# Patient Record
Sex: FEMALE | Race: WHITE | NOT HISPANIC OR LATINO | URBAN - METROPOLITAN AREA
[De-identification: names, ages, dates, MRNs, and addresses within clinical notes are randomized per-mention and may not be internally consistent; named-entity substitution may affect disease eponyms.]

---

## 2023-03-17 LAB — THYROTROPIN (MIU/L) IN SER/PLAS BY DETECTION LIMIT <= 0.05 MIU/L: 3.19 MIU/L (ref 0.44–3.98)

## 2023-09-08 PROBLEM — E04.2 NONTOXIC MULTINODULAR GOITER: Status: ACTIVE | Noted: 2023-09-08

## 2023-09-08 PROBLEM — R41.3 MEMORY CHANGE: Status: ACTIVE | Noted: 2023-09-08

## 2023-09-08 PROBLEM — G89.29 OTHER CHRONIC PAIN: Status: ACTIVE | Noted: 2023-09-08

## 2023-09-08 PROBLEM — R87.810 CERVICAL HIGH RISK HPV (HUMAN PAPILLOMAVIRUS) TEST POSITIVE: Status: ACTIVE | Noted: 2023-09-08

## 2023-09-08 PROBLEM — M54.41 LUMBAGO WITH SCIATICA, RIGHT SIDE: Status: ACTIVE | Noted: 2023-09-08

## 2023-09-08 PROBLEM — K58.9 IRRITABLE BOWEL: Status: ACTIVE | Noted: 2023-09-08

## 2023-09-08 PROBLEM — E06.3 HASHIMOTO'S THYROIDITIS: Status: ACTIVE | Noted: 2023-09-08

## 2023-09-08 PROBLEM — E78.5 HYPERLIPIDEMIA: Status: ACTIVE | Noted: 2023-09-08

## 2023-09-08 PROBLEM — G44.89 OTHER HEADACHE SYNDROME: Status: ACTIVE | Noted: 2023-09-08

## 2023-09-08 PROBLEM — E03.8 OTHER SPECIFIED HYPOTHYROIDISM: Status: ACTIVE | Noted: 2023-09-08

## 2023-09-08 RX ORDER — HYDROCORTISONE BUTYRATE 1 MG/ML
1 LOTION TOPICAL
COMMUNITY
Start: 2020-07-23 | End: 2024-01-17 | Stop reason: ALTCHOICE

## 2023-09-08 RX ORDER — CITALOPRAM 40 MG/1
TABLET, FILM COATED ORAL
COMMUNITY

## 2023-09-08 RX ORDER — LEVOTHYROXINE SODIUM 50 UG/1
TABLET ORAL
COMMUNITY
End: 2024-01-17

## 2023-09-08 RX ORDER — LEVOTHYROXINE SODIUM 75 UG/1
TABLET ORAL
COMMUNITY
End: 2024-01-17

## 2023-09-11 ENCOUNTER — HOSPITAL ENCOUNTER (OUTPATIENT)
Dept: DATA CONVERSION | Facility: HOSPITAL | Age: 58
Discharge: HOME | End: 2023-09-11
Payer: COMMERCIAL

## 2023-09-11 DIAGNOSIS — E78.49 OTHER HYPERLIPIDEMIA: ICD-10-CM

## 2023-09-11 DIAGNOSIS — Z00.00 ENCOUNTER FOR GENERAL ADULT MEDICAL EXAMINATION WITHOUT ABNORMAL FINDINGS: ICD-10-CM

## 2023-09-11 LAB
ALBUMIN SERPL-MCNC: 4.3 GM/DL (ref 3.5–5)
ALBUMIN/GLOB SERPL: 1.3 RATIO (ref 1.5–3)
ALP BLD-CCNC: 69 U/L (ref 35–125)
ALT SERPL-CCNC: 33 U/L (ref 5–40)
ANION GAP SERPL CALCULATED.3IONS-SCNC: 11 MMOL/L (ref 0–19)
APPEARANCE PLAS: CLEAR
AST SERPL-CCNC: 22 U/L (ref 5–40)
BACTERIA UR QL AUTO: NEGATIVE
BILIRUB SERPL-MCNC: 0.3 MG/DL (ref 0.1–1.2)
BILIRUB UR QL STRIP.AUTO: NEGATIVE
BUN SERPL-MCNC: 18 MG/DL (ref 8–25)
BUN/CREAT SERPL: 20 RATIO (ref 8–21)
CALCIUM SERPL-MCNC: 9.6 MG/DL (ref 8.5–10.4)
CHLORIDE SERPL-SCNC: 103 MMOL/L (ref 97–107)
CHOLEST SERPL-MCNC: 212 MG/DL (ref 133–200)
CHOLEST/HDLC SERPL: 3.7 RATIO
CLARITY UR: CLEAR
CO2 SERPL-SCNC: 26 MMOL/L (ref 24–31)
COLOR SPUN FLD: YELLOW
COLOR UR: COLORLESS
CREAT SERPL-MCNC: 0.9 MG/DL (ref 0.4–1.6)
DEPRECATED RDW RBC AUTO: 39.8 FL (ref 37–54)
ERYTHROCYTE [DISTWIDTH] IN BLOOD BY AUTOMATED COUNT: 11.8 % (ref 11.7–15)
FASTING STATUS PATIENT QL REPORTED: ABNORMAL
GFR SERPL CREATININE-BSD FRML MDRD: 75 ML/MIN/1.73 M2
GLOBULIN SER-MCNC: 3.2 G/DL (ref 1.9–3.7)
GLUCOSE SERPL-MCNC: 97 MG/DL (ref 65–99)
GLUCOSE UR STRIP.AUTO-MCNC: NEGATIVE MG/DL
HCT VFR BLD AUTO: 42.9 % (ref 36–44)
HDLC SERPL-MCNC: 57 MG/DL
HGB BLD-MCNC: 14.8 GM/DL (ref 12–15)
HGB UR QL STRIP.AUTO: NORMAL /HPF (ref 0–3)
HGB UR QL: NEGATIVE
HYALINE CASTS UR QL AUTO: NORMAL /LPF
KETONES UR QL STRIP.AUTO: NEGATIVE
LDLC SERPL CALC-MCNC: 144 MG/DL (ref 65–130)
LEUKOCYTE ESTERASE UR QL STRIP.AUTO: NEGATIVE
MCH RBC QN AUTO: 32.1 PG (ref 26–34)
MCHC RBC AUTO-ENTMCNC: 34.5 % (ref 31–37)
MCV RBC AUTO: 93.1 FL (ref 80–100)
MICROSCOPIC (UA): NORMAL
NITRITE UR QL STRIP.AUTO: NEGATIVE
NRBC BLD-RTO: 0 /100 WBC
PH UR STRIP.AUTO: 5.5 [PH] (ref 4.6–8)
PLATELET # BLD AUTO: 303 K/UL (ref 150–450)
PMV BLD AUTO: 9.3 CU (ref 7–12.6)
POTASSIUM SERPL-SCNC: 4.2 MMOL/L (ref 3.4–5.1)
PROT SERPL-MCNC: 7.5 G/DL (ref 5.9–7.9)
PROT UR STRIP.AUTO-MCNC: NEGATIVE MG/DL
RBC # BLD AUTO: 4.61 M/UL (ref 4–4.9)
SODIUM SERPL-SCNC: 140 MMOL/L (ref 133–145)
SP GR UR STRIP.AUTO: 1.01 (ref 1–1.03)
SQUAMOUS UR QL AUTO: NORMAL /HPF
TRIGL SERPL-MCNC: 57 MG/DL (ref 40–150)
URINE CULTURE: NORMAL
UROBILINOGEN UR QL STRIP.AUTO: NORMAL MG/DL (ref 0–1)
WBC # BLD AUTO: 7.7 K/UL (ref 4.5–11)
WBC #/AREA URNS AUTO: NORMAL /HPF (ref 0–3)

## 2023-09-13 ENCOUNTER — HOSPITAL ENCOUNTER (OUTPATIENT)
Dept: DATA CONVERSION | Facility: HOSPITAL | Age: 58
End: 2023-09-13

## 2023-09-13 DIAGNOSIS — Z12.31 ENCOUNTER FOR SCREENING MAMMOGRAM FOR MALIGNANT NEOPLASM OF BREAST: ICD-10-CM

## 2023-09-13 DIAGNOSIS — Z13.6 ENCOUNTER FOR SCREENING FOR CARDIOVASCULAR DISORDERS: ICD-10-CM

## 2023-10-12 ENCOUNTER — HOSPITAL ENCOUNTER (OUTPATIENT)
Dept: RADIOLOGY | Facility: HOSPITAL | Age: 58
Discharge: HOME | End: 2023-10-12
Payer: COMMERCIAL

## 2023-10-12 VITALS — HEIGHT: 66 IN | BODY MASS INDEX: 25.71 KG/M2 | WEIGHT: 160 LBS

## 2023-10-12 DIAGNOSIS — Z12.31 ENCOUNTER FOR SCREENING MAMMOGRAM FOR MALIGNANT NEOPLASM OF BREAST: ICD-10-CM

## 2023-10-12 DIAGNOSIS — Z13.6 ENCOUNTER FOR SCREENING FOR CARDIOVASCULAR DISORDERS: ICD-10-CM

## 2023-10-12 PROCEDURE — 77063 BREAST TOMOSYNTHESIS BI: CPT | Mod: 50

## 2023-10-12 PROCEDURE — 75571 CT HRT W/O DYE W/CA TEST: CPT

## 2023-11-16 ENCOUNTER — HOSPITAL ENCOUNTER (OUTPATIENT)
Dept: CARDIOLOGY | Facility: HOSPITAL | Age: 58
Discharge: HOME | End: 2023-11-16
Payer: COMMERCIAL

## 2023-11-16 DIAGNOSIS — R06.02 SHORTNESS OF BREATH: ICD-10-CM

## 2023-11-16 PROCEDURE — 93016 CV STRESS TEST SUPVJ ONLY: CPT | Performed by: INTERNAL MEDICINE

## 2023-11-16 PROCEDURE — 93018 CV STRESS TEST I&R ONLY: CPT | Performed by: INTERNAL MEDICINE

## 2023-11-16 PROCEDURE — 93350 STRESS TTE ONLY: CPT | Performed by: INTERNAL MEDICINE

## 2023-11-16 PROCEDURE — 93350 STRESS TTE ONLY: CPT

## 2023-11-17 ENCOUNTER — TELEPHONE (OUTPATIENT)
Dept: ENDOCRINOLOGY | Facility: CLINIC | Age: 58
End: 2023-11-17
Payer: COMMERCIAL

## 2023-11-17 NOTE — TELEPHONE ENCOUNTER
Kathleen M Dance   1965   [id number]   [phone]     Called patient and left voice message in regards to canceling 2/8/24 appt due to provider is not available. However patient was advise to call office to reschedule appt.

## 2023-12-12 ENCOUNTER — APPOINTMENT (OUTPATIENT)
Dept: CARDIOLOGY | Facility: CLINIC | Age: 58
End: 2023-12-12
Payer: COMMERCIAL

## 2024-01-17 DIAGNOSIS — I10 PRIMARY HYPERTENSION: ICD-10-CM

## 2024-01-17 NOTE — TELEPHONE ENCOUNTER
Requested Prescriptions     Pending Prescriptions Disp Refills    isosorbide mononitrate ER (Imdur) 30 mg 24 hr tablet [Pharmacy Med Name: ISOSORBIDE MONONIT ER 30 MG TB] 15 tablet 5     Sig: Take 0.5 tablets (15 mg) by mouth once daily.     Please send the attached prescription for the patient. They have a follow up scheduled. Thank you.

## 2024-01-22 RX ORDER — ISOSORBIDE MONONITRATE 30 MG/1
15 TABLET, EXTENDED RELEASE ORAL DAILY
Qty: 15 TABLET | Refills: 5 | Status: SHIPPED | OUTPATIENT
Start: 2024-01-22 | End: 2024-07-20

## 2024-02-19 DIAGNOSIS — E06.3 AUTOIMMUNE THYROIDITIS: ICD-10-CM

## 2024-02-19 RX ORDER — LEVOTHYROXINE SODIUM 75 UG/1
75 TABLET ORAL DAILY
Qty: 30 TABLET | Refills: 11 | Status: SHIPPED | OUTPATIENT
Start: 2024-02-19 | End: 2024-02-27 | Stop reason: SDUPTHER

## 2024-02-25 NOTE — PROGRESS NOTES
HPI   58 yo previously of Dr. Esposito's practice with hashimoto's disease (dx late 40's) taking synthroid 75mcg qd (ruy).           Pt had thyroid u/s with Dr. Esposito nov 26, 2019: decreased dimensions, heterogenous, 0.62cm X 0.43 X 0.19cm nodule near isthmus, stable compared to previous ultrasounds.     Since last visit:        No clear obstructive goiter symptoms.  Pt is euthyroid on her current dose, borderline low energy/stressed, feeling depressed recently.      Current Outpatient Medications:     buPROPion XL (Wellbutrin XL) 150 mg 24 hr tablet, Take 1 tablet (150 mg) by mouth once daily., Disp: , Rfl:     citalopram (CeleXA) 20 mg tablet, Take 1 tablet (20 mg) by mouth once daily., Disp: , Rfl:     cholecalciferol, vitamin D3, (VITAMIN D3 ORAL), , Disp: , Rfl:     citalopram (CeleXA) 40 mg tablet, , Disp: , Rfl:     isosorbide mononitrate ER (Imdur) 30 mg 24 hr tablet, Take 0.5 tablets (15 mg) by mouth once daily., Disp: 15 tablet, Rfl: 5    Synthroid 75 mcg tablet, TAKE 1 TABLET BY MOUTH EVERY DAY, Disp: 30 tablet, Rfl: 11      Allergies as of 02/27/2024 - Reviewed 02/27/2024   Allergen Reaction Noted    Metronidazole Rash and Other 02/16/2006    Sulfa (sulfonamide antibiotics) Rash and Other 02/16/2006    Levothyroxine sodium Unknown and Confusion 09/08/2023    Nabumetone Other 10/30/2014         Review of Systems   Cardiology: Lightheadedness-denies.  Chest pain-denies.  Leg edema-denies.  Palpitations-denies.  Respiratory: Cough-denies. Shortness of breath-denies.  Wheezing-denies.  Gastroenterology: Constipation-denies.  Diarrhea-denies.  Heartburn-denies.  Endocrinology: Cold intolerance-denies.  Heat intolerance-denies.  Sweats-denies.  Neurology: Headache-denies.  Tremor-denies.  Neuropathy in extremities-denies.  Psychology: Low energy-denies.  Irritability-denies.  Sleep disturbances-denies.      /73 (BP Location: Left arm, Patient Position: Sitting, BP Cuff Size: Large adult)   Pulse 84   Wt  "75.1 kg (165 lb 9.6 oz)   BMI 26.73 kg/m²       Labs:  Lab Results   Component Value Date    WBC 7.7 09/11/2023    NRBC 0 09/11/2023    RBC 4.61 09/11/2023    HGB 14.8 09/11/2023    HCT 42.9 09/11/2023     09/11/2023     Lab Results   Component Value Date    CALCIUM 9.6 09/11/2023    AST 22 09/11/2023    ALKPHOS 69 09/11/2023    BILITOT 0.3 09/11/2023    PROT 7.5 09/11/2023    ALBUMIN 4.3 09/11/2023    GLOB 3.2 09/11/2023    AGR 1.3 (L) 09/11/2023     09/11/2023    K 4.2 09/11/2023     09/11/2023    CO2 26 09/11/2023    ANIONGAP 11 09/11/2023    BUN 18 09/11/2023    CREATININE 0.9 09/11/2023    UREACREAUR 20.0 09/11/2023    GLUCOSE 97 09/11/2023    ALT 33 09/11/2023    EGFR 75 09/11/2023     Lab Results   Component Value Date    CHOL 212 (H) 09/11/2023    TRIG 57 09/11/2023    HDL 57 09/11/2023    LDLCALC 144 (H) 09/11/2023     No results found for: \"MICROALBCREA\"  Lab Results   Component Value Date    TSH 3.19 03/17/2023         Physical Exam   General Appearance: pleasant, cooperative, no acute distress  HEENT: no chemosis, no proptosis, no lid lag, no lid retraction  Neck: no lymphadenopathy, no thyromegaly, no dominant thyroid nodules  Heart: no murmurs, regular rate and rhythm, S1 and S2  Lungs: no wheezes, no rhonci, no rales  Extremities: no lower extremity swelling      Assessment/Plan   1. Hashimoto's thyroiditis  -repeat labs now and ordered for 1 year follow up  -refills given  -ok to follow with pcp and see endo prn at this point         Follow Up:  prn    -labs/tests/notes reviewed  -reviewed and counseled patient on medication monitoring and side effects          "

## 2024-02-27 ENCOUNTER — OFFICE VISIT (OUTPATIENT)
Dept: ENDOCRINOLOGY | Facility: CLINIC | Age: 59
End: 2024-02-27
Payer: COMMERCIAL

## 2024-02-27 VITALS
BODY MASS INDEX: 26.73 KG/M2 | DIASTOLIC BLOOD PRESSURE: 73 MMHG | WEIGHT: 165.6 LBS | SYSTOLIC BLOOD PRESSURE: 116 MMHG | HEART RATE: 84 BPM

## 2024-02-27 DIAGNOSIS — E06.3 AUTOIMMUNE THYROIDITIS: ICD-10-CM

## 2024-02-27 DIAGNOSIS — E06.3 HASHIMOTO'S THYROIDITIS: Primary | ICD-10-CM

## 2024-02-27 PROCEDURE — 4004F PT TOBACCO SCREEN RCVD TLK: CPT | Performed by: INTERNAL MEDICINE

## 2024-02-27 PROCEDURE — 99213 OFFICE O/P EST LOW 20 MIN: CPT | Performed by: INTERNAL MEDICINE

## 2024-02-27 RX ORDER — CITALOPRAM 20 MG/1
20 TABLET, FILM COATED ORAL DAILY
COMMUNITY
Start: 2024-02-21

## 2024-02-27 RX ORDER — LEVOTHYROXINE SODIUM 75 UG/1
75 TABLET ORAL DAILY
Qty: 90 TABLET | Refills: 3 | Status: SHIPPED | OUTPATIENT
Start: 2024-02-27 | End: 2024-03-14 | Stop reason: DRUGHIGH

## 2024-02-27 RX ORDER — BUPROPION HYDROCHLORIDE 150 MG/1
150 TABLET ORAL DAILY
COMMUNITY
Start: 2024-02-22

## 2024-02-27 ASSESSMENT — PAIN SCALES - GENERAL: PAINLEVEL: 0-NO PAIN

## 2024-02-27 ASSESSMENT — ENCOUNTER SYMPTOMS: DEPRESSION: 1

## 2024-03-13 ENCOUNTER — LAB (OUTPATIENT)
Dept: LAB | Facility: LAB | Age: 59
End: 2024-03-13
Payer: COMMERCIAL

## 2024-03-13 DIAGNOSIS — E06.3 HASHIMOTO'S THYROIDITIS: ICD-10-CM

## 2024-03-13 LAB — TSH SERPL DL<=0.05 MIU/L-ACNC: 3.66 MIU/L (ref 0.27–4.2)

## 2024-03-13 PROCEDURE — 84443 ASSAY THYROID STIM HORMONE: CPT

## 2024-03-13 PROCEDURE — 36415 COLL VENOUS BLD VENIPUNCTURE: CPT

## 2024-03-14 DIAGNOSIS — E06.3 HASHIMOTO'S THYROIDITIS: Primary | ICD-10-CM

## 2024-03-14 RX ORDER — LEVOTHYROXINE SODIUM 88 UG/1
88 TABLET ORAL DAILY
Qty: 90 TABLET | Refills: 3 | Status: SHIPPED | OUTPATIENT
Start: 2024-03-14 | End: 2025-03-14

## 2024-11-20 ENCOUNTER — APPOINTMENT (OUTPATIENT)
Dept: RADIOLOGY | Facility: HOSPITAL | Age: 59
End: 2024-11-20
Payer: COMMERCIAL

## 2024-11-20 ENCOUNTER — HOSPITAL ENCOUNTER (EMERGENCY)
Facility: HOSPITAL | Age: 59
Discharge: HOME | End: 2024-11-20
Payer: COMMERCIAL

## 2024-11-20 ENCOUNTER — APPOINTMENT (OUTPATIENT)
Dept: CARDIOLOGY | Facility: HOSPITAL | Age: 59
End: 2024-11-20
Payer: COMMERCIAL

## 2024-11-20 VITALS
OXYGEN SATURATION: 98 % | WEIGHT: 165 LBS | DIASTOLIC BLOOD PRESSURE: 74 MMHG | HEART RATE: 64 BPM | HEIGHT: 66 IN | RESPIRATION RATE: 16 BRPM | SYSTOLIC BLOOD PRESSURE: 124 MMHG | TEMPERATURE: 97.7 F | BODY MASS INDEX: 26.52 KG/M2

## 2024-11-20 DIAGNOSIS — U07.1 COVID-19: Primary | ICD-10-CM

## 2024-11-20 LAB
ALBUMIN SERPL BCP-MCNC: 4.2 G/DL (ref 3.4–5)
ALP SERPL-CCNC: 57 U/L (ref 33–110)
ALT SERPL W P-5'-P-CCNC: 57 U/L (ref 7–45)
ANION GAP SERPL CALCULATED.3IONS-SCNC: 13 MMOL/L (ref 10–20)
AST SERPL W P-5'-P-CCNC: 32 U/L (ref 9–39)
BASOPHILS # BLD AUTO: 0.02 X10*3/UL (ref 0–0.1)
BASOPHILS NFR BLD AUTO: 0.4 %
BILIRUB SERPL-MCNC: 0.4 MG/DL (ref 0–1.2)
BUN SERPL-MCNC: 10 MG/DL (ref 6–23)
CALCIUM SERPL-MCNC: 9 MG/DL (ref 8.6–10.3)
CARDIAC TROPONIN I PNL SERPL HS: 5 NG/L (ref 0–13)
CHLORIDE SERPL-SCNC: 105 MMOL/L (ref 98–107)
CO2 SERPL-SCNC: 22 MMOL/L (ref 21–32)
CREAT SERPL-MCNC: 0.74 MG/DL (ref 0.5–1.05)
EGFRCR SERPLBLD CKD-EPI 2021: >90 ML/MIN/1.73M*2
EOSINOPHIL # BLD AUTO: 0.09 X10*3/UL (ref 0–0.7)
EOSINOPHIL NFR BLD AUTO: 1.8 %
ERYTHROCYTE [DISTWIDTH] IN BLOOD BY AUTOMATED COUNT: 12.3 % (ref 11.5–14.5)
FLUAV RNA RESP QL NAA+PROBE: NOT DETECTED
FLUBV RNA RESP QL NAA+PROBE: NOT DETECTED
GLUCOSE SERPL-MCNC: 94 MG/DL (ref 74–99)
HCT VFR BLD AUTO: 43.1 % (ref 36–46)
HGB BLD-MCNC: 15.1 G/DL (ref 12–16)
IMM GRANULOCYTES # BLD AUTO: 0.01 X10*3/UL (ref 0–0.7)
IMM GRANULOCYTES NFR BLD AUTO: 0.2 % (ref 0–0.9)
LYMPHOCYTES # BLD AUTO: 2.4 X10*3/UL (ref 1.2–4.8)
LYMPHOCYTES NFR BLD AUTO: 49 %
MAGNESIUM SERPL-MCNC: 2 MG/DL (ref 1.6–2.4)
MCH RBC QN AUTO: 32.4 PG (ref 26–34)
MCHC RBC AUTO-ENTMCNC: 35 G/DL (ref 32–36)
MCV RBC AUTO: 93 FL (ref 80–100)
MONOCYTES # BLD AUTO: 0.25 X10*3/UL (ref 0.1–1)
MONOCYTES NFR BLD AUTO: 5.1 %
NEUTROPHILS # BLD AUTO: 2.13 X10*3/UL (ref 1.2–7.7)
NEUTROPHILS NFR BLD AUTO: 43.5 %
NRBC BLD-RTO: 0 /100 WBCS (ref 0–0)
PLATELET # BLD AUTO: 278 X10*3/UL (ref 150–450)
POTASSIUM SERPL-SCNC: 3.9 MMOL/L (ref 3.5–5.3)
PROT SERPL-MCNC: 7.7 G/DL (ref 6.4–8.2)
RBC # BLD AUTO: 4.66 X10*6/UL (ref 4–5.2)
SARS-COV-2 RNA RESP QL NAA+PROBE: DETECTED
SODIUM SERPL-SCNC: 136 MMOL/L (ref 136–145)
WBC # BLD AUTO: 4.9 X10*3/UL (ref 4.4–11.3)

## 2024-11-20 PROCEDURE — 84484 ASSAY OF TROPONIN QUANT: CPT

## 2024-11-20 PROCEDURE — 84132 ASSAY OF SERUM POTASSIUM: CPT

## 2024-11-20 PROCEDURE — 99283 EMERGENCY DEPT VISIT LOW MDM: CPT

## 2024-11-20 PROCEDURE — 83735 ASSAY OF MAGNESIUM: CPT

## 2024-11-20 PROCEDURE — 93005 ELECTROCARDIOGRAM TRACING: CPT

## 2024-11-20 PROCEDURE — 71045 X-RAY EXAM CHEST 1 VIEW: CPT | Performed by: RADIOLOGY

## 2024-11-20 PROCEDURE — 36415 COLL VENOUS BLD VENIPUNCTURE: CPT

## 2024-11-20 PROCEDURE — 87636 SARSCOV2 & INF A&B AMP PRB: CPT

## 2024-11-20 PROCEDURE — 71045 X-RAY EXAM CHEST 1 VIEW: CPT

## 2024-11-20 PROCEDURE — 85025 COMPLETE CBC W/AUTO DIFF WBC: CPT

## 2024-11-20 ASSESSMENT — COLUMBIA-SUICIDE SEVERITY RATING SCALE - C-SSRS
2. HAVE YOU ACTUALLY HAD ANY THOUGHTS OF KILLING YOURSELF?: NO
1. IN THE PAST MONTH, HAVE YOU WISHED YOU WERE DEAD OR WISHED YOU COULD GO TO SLEEP AND NOT WAKE UP?: NO
6. HAVE YOU EVER DONE ANYTHING, STARTED TO DO ANYTHING, OR PREPARED TO DO ANYTHING TO END YOUR LIFE?: NO

## 2024-11-20 ASSESSMENT — PAIN - FUNCTIONAL ASSESSMENT
PAIN_FUNCTIONAL_ASSESSMENT: 0-10
PAIN_FUNCTIONAL_ASSESSMENT: 0-10

## 2024-11-20 ASSESSMENT — PAIN SCALES - GENERAL
PAINLEVEL_OUTOF10: 0 - NO PAIN
PAINLEVEL_OUTOF10: 0 - NO PAIN

## 2024-11-20 ASSESSMENT — LIFESTYLE VARIABLES
EVER FELT BAD OR GUILTY ABOUT YOUR DRINKING: NO
HAVE YOU EVER FELT YOU SHOULD CUT DOWN ON YOUR DRINKING: NO
HAVE PEOPLE ANNOYED YOU BY CRITICIZING YOUR DRINKING: NO
TOTAL SCORE: 0
EVER HAD A DRINK FIRST THING IN THE MORNING TO STEADY YOUR NERVES TO GET RID OF A HANGOVER: NO

## 2024-11-20 NOTE — Clinical Note
Kathleen Dance was seen and treated in our emergency department on 11/20/2024.  She may return to work on 11/22/2024.       If you have any questions or concerns, please don't hesitate to call.      Radha Woodson PA-C

## 2024-11-21 NOTE — ED PROVIDER NOTES
HPI   Chief Complaint   Patient presents with    Chest Pain       HPI  Patient is a 59-year-old female presenting to ER for evaluation of cough, congestion, sore throat that started a few days ago and is now associated with chest pressure when she is coughing.  Patient states that she recently traveled from [facility] and feels she is coming down with the flu.  Denies any fevers or chills.  Does not endorse shortness of breath.  Denies any abdominal pain, nausea or vomiting or diarrhea.  Denies urinary symptoms.  She denies any significant cardiac history or history of DVT or PE.  Has no other acute complaints.      Patient History   Past Medical History:   Diagnosis Date    Hashimoto's thyroiditis     Nontoxic multinodular goiter      Past Surgical History:   Procedure Laterality Date    ABDOMINAL HERNIA REPAIR Right 2002    APPENDECTOMY      BLADDER SUSPENSION  05/2015    COLPOSCOPY      DILATION AND CURETTAGE OF UTERUS      KNEE ARTHROSCOPY W/ MENISCAL REPAIR Left     TONSILLECTOMY      TUBAL LIGATION       Family History   Problem Relation Name Age of Onset    Other (thyroid) Mother      No Known Problems Father      Cancer Brother          metastatic    Liver cancer Maternal Grandmother       Social History     Tobacco Use    Smoking status: Some Days     Types: Cigarettes     Passive exposure: Current    Smokeless tobacco: Not on file   Substance Use Topics    Alcohol use: Yes    Drug use: Never       Physical Exam   ED Triage Vitals [11/20/24 1612]   Temperature Heart Rate Respirations BP   36.5 °C (97.7 °F) 80 18 126/74      Pulse Ox Temp Source Heart Rate Source Patient Position   100 % Oral Monitor Sitting      BP Location FiO2 (%)     Left arm --       Physical Exam  Vitals and nursing note reviewed.   Constitutional:       General: She is not in acute distress.     Appearance: She is well-developed.   HENT:      Head: Normocephalic.   Eyes:      Conjunctiva/sclera: Conjunctivae normal.   Cardiovascular:       Rate and Rhythm: Normal rate and regular rhythm.      Pulses:           Radial pulses are 2+ on the right side and 2+ on the left side.      Heart sounds: No murmur heard.  Pulmonary:      Effort: Pulmonary effort is normal. No respiratory distress.      Breath sounds: Normal breath sounds.      Comments: Lungs clear to auscultation bilaterally  Musculoskeletal:         General: No swelling.      Right lower leg: No edema.      Left lower leg: No edema.   Skin:     General: Skin is warm and dry.   Neurological:      Mental Status: She is alert.           ED Course & MDM   ED Course as of 11/24/24 0845   Wed Nov 20, 2024   1611 EKG interpreted by myself independently, EKG shows a normal sinus rhythm, rate 81 bpm, VA interval 154, QRS 76, , QTc 464, patient has no ST elevations or depressions, negative for acute MI. [ELDER]      ED Course User Index  [ELDER] Arash KILPATRICK Melony,          Diagnoses as of 11/24/24 0845   COVID-19                 No data recorded     Yolanda Coma Scale Score: 15 (11/20/24 2018 : Vicky Gonzalez RN)                           Medical Decision Making  Parts of this chart have been completed using voice recognition software. Please excuse any errors of transcription.  My thought process and reason for plan has been formulated from the time that I saw the patient until the time of disposition and is not specific to one specific moment during their visit and furthermore my MDM encompasses this entire chart and not only this text box.      HPI: Detailed above.    Exam: A medically appropriate exam performed, outlined above, given the known history and presentation.    History obtained from: Patient    EKG: Interpreted by attending physician and reviewed by me    Social Determinants of Health considered during this visit: Lives independently    Medications given during visit:  Medications - No data to display     Diagnostic/tests  Labs Reviewed   COMPREHENSIVE METABOLIC PANEL - Abnormal        Result Value    Glucose 94      Sodium 136      Potassium 3.9      Chloride 105      Bicarbonate 22      Anion Gap 13      Urea Nitrogen 10      Creatinine 0.74      eGFR >90      Calcium 9.0      Albumin 4.2      Alkaline Phosphatase 57      Total Protein 7.7      AST 32      Bilirubin, Total 0.4      ALT 57 (*)    SARS-COV-2 PCR - Abnormal    Coronavirus 2019, PCR Detected (*)     Narrative:     This assay has received FDA Emergency Use Authorization (EUA) and is only authorized for the duration of time that circumstances exist to justify the authorization of the emergency use of in vitro diagnostic tests for the detection of SARS-CoV-2 virus and/or diagnosis of COVID-19 infection under section 564(b)(1) of the Act, 21 U.S.C. 360bbb-3(b)(1). This assay is an in vitro diagnostic nucleic acid amplification test for the qualitative detection of SARS-CoV-2 from nasopharyngeal specimens and has been validated for use at [facility]. Negative results do not preclude COVID-19 infections and should not be used as the sole basis for diagnosis, treatment, or other management decisions.     MAGNESIUM - Normal    Magnesium 2.00     INFLUENZA A AND B PCR - Normal    Flu A Result Not Detected      Flu B Result Not Detected      Narrative:     This assay is an in vitro diagnostic multiplex nucleic acid amplification test for the detection and discrimination of Influenza A & B from nasopharyngeal specimens, and has been validated for use at [facility]. Negative results do not preclude Influenza A/B infections, and should not be used as the sole basis for diagnosis, treatment, or other management decisions. If Influenza A/B and RSV PCR results are negative, testing for Parainfluenza virus, Adenovirus and Metapneumovirus is routinely performed for [facility] pediatric oncology and intensive care inpatients, and is available on other patients by placing an add-on request.   SERIAL  TROPONIN-INITIAL - Normal    Troponin I, High Sensitivity 5      Narrative:     Less than 99th percentile of normal range cutoff-  Female and children under 18 years old <14 ng/L; Male <21 ng/L: Negative  Repeat testing should be performed if clinically indicated.     Female and children under 18 years old 14-50 ng/L; Male 21-50 ng/L:  Consistent with possible cardiac damage and possible increased clinical   risk. Serial measurements may help to assess extent of myocardial damage.     >50 ng/L: Consistent with cardiac damage, increased clinical risk and  myocardial infarction. Serial measurements may help assess extent of   myocardial damage.      NOTE: Children less than 1 year old may have higher baseline troponin   levels and results should be interpreted in conjunction with the overall   clinical context.     NOTE: Troponin I testing is performed using a different   testing methodology at [facility] than at other   [facility]. Direct result comparisons should only   be made within the same method.   CBC WITH AUTO DIFFERENTIAL    WBC 4.9      nRBC 0.0      RBC 4.66      Hemoglobin 15.1      Hematocrit 43.1      MCV 93      MCH 32.4      MCHC 35.0      RDW 12.3      Platelets 278      Neutrophils % 43.5      Immature Granulocytes %, Automated 0.2      Lymphocytes % 49.0      Monocytes % 5.1      Eosinophils % 1.8      Basophils % 0.4      Neutrophils Absolute 2.13      Immature Granulocytes Absolute, Automated 0.01      Lymphocytes Absolute 2.40      Monocytes Absolute 0.25      Eosinophils Absolute 0.09      Basophils Absolute 0.02     TROPONIN SERIES- (INITIAL, 1 HR)    Narrative:     The following orders were created for panel order Troponin I Series, High Sensitivity (0, 1 HR).  Procedure                               Abnormality         Status                     ---------                               -----------         ------                     Troponin I, High Sensiti...[[id number]]   Normal              Final result               Troponin, High Sensitivi...[[id number]]                                                   Please view results for these tests on the individual orders.   SERIAL TROPONIN, 1 HOUR      XR chest 1 view   Final Result   1.  No evidence of acute cardiopulmonary process.                  MACRO:   None        Signed by: Stefan Dow 11/20/2024 5:35 PM   Dictation workstation:   [id number]           Considerations/further MDM:  Patient is a 59-year-old female presenting for evaluation of flu symptoms, chest pressure    Patient is nontoxic-appearing on examination and in no apparent distress during the visit.  Vital signs are within normal limits.  EKG is without acute ischemia and initial cardiac enzyme is negative.  Laboratory workup is without leukocytosis, CURT, electrolyte abnormality.  Patient is COVID-positive likely contributing to her symptoms.  Chest x-ray does not reveal evidence of pneumonia, pneumothorax or findings consistent with CHF.  My suspicion for ACS, PE, aortic dissection, esophageal perforation is low at this time based on history and clinical presentation.  Supportive care advised as well as close follow-up with PCP in the next 2 to 3 days.  I discussed the laboratory and imaging findings with the patient at bedside. Patient's questions and concerns were addressed. Patient was released in good condition, discharged with instructions to follow up with primary care provider and appropriate specialist, and to return to ED at any time for worsening symptoms or any other concerns. Patient demonstrates understanding of the findings and the importance of appropriate follow up care.         Procedure  Procedures     Radha Woodson PA-C  11/24/24 0845

## 2024-11-21 NOTE — DISCHARGE INSTRUCTIONS
Continue with supportive care at home with Tylenol, Motrin, DayQuil or NyQuil and cough suppressants.  Refer to the attached instructions regarding COVID-19.  Please quarantine at home to prevent the spread of infection.    It is important to remember that your care does not end here and you must continue to monitor your condition closely. Please return to the emergency department for any worsening or concerning signs or symptoms as directed by our conversations and the discharge instructions. If you do not have a doctor please contact the referral number on your discharge instructions. Please contact any physician specialists provided in your discharge notes as it is very important to follow up with them regarding your condition. If you are unable to reach the physicians provided, please come back to the Emergency Department at any time.

## 2024-11-24 LAB
ATRIAL RATE: 81 BPM
P AXIS: 69 DEGREES
P OFFSET: 195 MS
P ONSET: 142 MS
PR INTERVAL: 154 MS
Q ONSET: 219 MS
QRS COUNT: 13 BEATS
QRS DURATION: 76 MS
QT INTERVAL: 400 MS
QTC CALCULATION(BAZETT): 464 MS
QTC FREDERICIA: 442 MS
R AXIS: 74 DEGREES
T AXIS: 89 DEGREES
T OFFSET: 419 MS
VENTRICULAR RATE: 81 BPM

## 2025-01-20 ASSESSMENT — ENCOUNTER SYMPTOMS
WEAKNESS: 0
JOINT SWELLING: 0
ABDOMINAL DISTENTION: 0
COLOR CHANGE: 0
ADENOPATHY: 0
ABDOMINAL PAIN: 0
FATIGUE: 0
DIZZINESS: 0
ACTIVITY CHANGE: 0
DYSURIA: 0
UNEXPECTED WEIGHT CHANGE: 0
DIFFICULTY URINATING: 0
HEADACHES: 0
CHEST TIGHTNESS: 0
SHORTNESS OF BREATH: 0

## 2025-01-20 NOTE — PROGRESS NOTES
"Annual-menopause  Subjective   Kristy is a 60 y.o. NEW pt; last gyn exam at [facility] 2020 with Dr. Aguilar; who is here for a routine exam.   Complaints: has appt today for mamm; denies vag bleed or discharge; denies pelvic  pain, pressure, or persistent bloating.  PMHx: Mild asthma, hypothyroid, hiatal hernia, liver hemangioma, hematometra.  Colonic polyps, SCOPE completed  2021 at [facility] neg; rpt due .  Bulging discs in lower back.  Gyn History:       Last pap  2020 neg, hpv neg.     Mamm 10/12/2023 neg/stable nodules R breast  Pelvic US  2016: 9 cm RV uterus/1.4 cm fundal fibroid/2 cm loculated fluid w/n endometrium/nl ovaries. (Hx ablation )  Menarche 18.  Last Period 2016=age 51.   STDs  HPV pos , all neg ever since. NOT currently sexually active.   Total preg 5. live births  2. Miscarriage(s)  3.   Surg Hx: tonsils, laparoscopy , LT knee meniscal repair , D&C , tubal ligation , colposcopy , novasure 2013, appy , RT inguinal hernia , bladder suspension 2015.  Hosp: back pain/ unable to walk 2016.  Father: , Unknown dx;  Mother: alive, gerd/hiatal hernia; age 81/very active  Review of Systems   Constitutional:  Negative for activity change, fatigue and unexpected weight change.   Respiratory:  Negative for chest tightness and shortness of breath.    Cardiovascular:  Negative for chest pain and leg swelling.   Gastrointestinal:  Negative for abdominal distention and abdominal pain.   Genitourinary:  Negative for difficulty urinating, dysuria, genital sores, pelvic pain, vaginal bleeding, vaginal discharge and vaginal pain.   Musculoskeletal:  Negative for gait problem and joint swelling.   Skin:  Negative for color change and rash.   Neurological:  Negative for dizziness, weakness and headaches.   Hematological:  Negative for adenopathy.   Objective Visit Vitals  /66   Ht 1.676 m (5' 6\")   Wt 75.8 kg (167 lb 3.2 oz)   BMI 26.99 kg/m²   OB Status Postmenopausal "   Smoking Status Former   BSA 1.88 m²       General:   Alert and oriented, in no acute distress   Neck: Supple. No visible thyromegaly.    Breast/Axilla: Normal to palpation bilaterally without masses, skin changes, or nipple discharge.    Abdomen: Soft, non-tender, without masses or organomegaly   Vulva: Normal architecture without erythema, masses, or lesions.    Vagina: Normal mucosa without lesions, masses.  No abnormal vaginal discharge.    Cervix: Normal without masses, lesions, or signs of cervicitis. Pap sent   Uterus: Normal mobile, non-enlarged uterus    Adnexa: No palpable masses or tenderness   Pelvic Floor No POP noted. No high tone pelvic floor    Psych  Rectal Normal affect. Normal mood.   deferred   Assessment/Plan   Encounter Diagnoses   Name Primary?    Visit for gynecologic examination; grossly normal breast and GYN exam Yes    Screening for human papillomavirus; Pap sent     Encounter for screening mammogram for malignant neoplasm of breast; patient has appointment today     History of endometrial ablation; no recent bleeding or pain     Menopause; gets warm only in the evening with dogs laying near by     Screen colon cancer; positive family history; personal history of polyps; gets scoped every 5 years at [facility].     Abbie Arriaga MD

## 2025-01-28 ENCOUNTER — OFFICE VISIT (OUTPATIENT)
Dept: OBSTETRICS AND GYNECOLOGY | Facility: CLINIC | Age: 60
End: 2025-01-28
Payer: COMMERCIAL

## 2025-01-28 ENCOUNTER — APPOINTMENT (OUTPATIENT)
Dept: RADIOLOGY | Facility: CLINIC | Age: 60
End: 2025-01-28
Payer: COMMERCIAL

## 2025-01-28 ENCOUNTER — HOSPITAL ENCOUNTER (OUTPATIENT)
Dept: RADIOLOGY | Facility: HOSPITAL | Age: 60
Discharge: HOME | End: 2025-01-28
Payer: COMMERCIAL

## 2025-01-28 VITALS
HEIGHT: 66 IN | WEIGHT: 167.2 LBS | BODY MASS INDEX: 26.87 KG/M2 | SYSTOLIC BLOOD PRESSURE: 110 MMHG | DIASTOLIC BLOOD PRESSURE: 66 MMHG

## 2025-01-28 DIAGNOSIS — Z11.51 SCREENING FOR HUMAN PAPILLOMAVIRUS: ICD-10-CM

## 2025-01-28 DIAGNOSIS — Z12.31 ENCOUNTER FOR SCREENING MAMMOGRAM FOR MALIGNANT NEOPLASM OF BREAST: ICD-10-CM

## 2025-01-28 DIAGNOSIS — Z12.31 SCREENING MAMMOGRAM FOR BREAST CANCER: ICD-10-CM

## 2025-01-28 DIAGNOSIS — Z12.11 SCREEN FOR COLON CANCER: ICD-10-CM

## 2025-01-28 DIAGNOSIS — Z01.419 VISIT FOR GYNECOLOGIC EXAMINATION: Primary | ICD-10-CM

## 2025-01-28 DIAGNOSIS — Z78.0 MENOPAUSE: ICD-10-CM

## 2025-01-28 DIAGNOSIS — Z98.890 HISTORY OF ENDOMETRIAL ABLATION: ICD-10-CM

## 2025-01-28 PROCEDURE — 3008F BODY MASS INDEX DOCD: CPT | Performed by: OBSTETRICS & GYNECOLOGY

## 2025-01-28 PROCEDURE — 77067 SCR MAMMO BI INCL CAD: CPT

## 2025-01-28 PROCEDURE — 77067 SCR MAMMO BI INCL CAD: CPT | Performed by: STUDENT IN AN ORGANIZED HEALTH CARE EDUCATION/TRAINING PROGRAM

## 2025-01-28 PROCEDURE — 99386 PREV VISIT NEW AGE 40-64: CPT | Performed by: OBSTETRICS & GYNECOLOGY

## 2025-01-28 PROCEDURE — 99396 PREV VISIT EST AGE 40-64: CPT | Performed by: OBSTETRICS & GYNECOLOGY

## 2025-01-28 PROCEDURE — 77063 BREAST TOMOSYNTHESIS BI: CPT | Performed by: STUDENT IN AN ORGANIZED HEALTH CARE EDUCATION/TRAINING PROGRAM

## 2025-01-28 ASSESSMENT — LIFESTYLE VARIABLES
AUDIT-C TOTAL SCORE: 0
SKIP TO QUESTIONS 9-10: 1
HOW OFTEN DO YOU HAVE A DRINK CONTAINING ALCOHOL: NEVER
HOW MANY STANDARD DRINKS CONTAINING ALCOHOL DO YOU HAVE ON A TYPICAL DAY: PATIENT DOES NOT DRINK
HOW OFTEN DO YOU HAVE SIX OR MORE DRINKS ON ONE OCCASION: NEVER

## 2025-01-28 ASSESSMENT — ENCOUNTER SYMPTOMS
OCCASIONAL FEELINGS OF UNSTEADINESS: 0
LOSS OF SENSATION IN FEET: 0
DEPRESSION: 0

## 2025-01-28 ASSESSMENT — PATIENT HEALTH QUESTIONNAIRE - PHQ9
2. FEELING DOWN, DEPRESSED OR HOPELESS: NOT AT ALL
SUM OF ALL RESPONSES TO PHQ9 QUESTIONS 1 & 2: 0
1. LITTLE INTEREST OR PLEASURE IN DOING THINGS: NOT AT ALL

## 2025-01-28 ASSESSMENT — PAIN SCALES - GENERAL: PAINLEVEL_OUTOF10: 0-NO PAIN

## 2025-04-07 DIAGNOSIS — E06.3 HASHIMOTO'S THYROIDITIS: ICD-10-CM

## 2025-04-07 RX ORDER — LEVOTHYROXINE SODIUM 88 UG/1
88 TABLET ORAL DAILY
Qty: 30 TABLET | Refills: 11 | Status: SHIPPED | OUTPATIENT
Start: 2025-04-07